# Patient Record
Sex: MALE | URBAN - METROPOLITAN AREA
[De-identification: names, ages, dates, MRNs, and addresses within clinical notes are randomized per-mention and may not be internally consistent; named-entity substitution may affect disease eponyms.]

---

## 2021-11-09 ENCOUNTER — HOSPITAL ENCOUNTER (EMERGENCY)
Facility: CLINIC | Age: 61
Discharge: HOME | End: 2021-11-09
Attending: EMERGENCY MEDICINE

## 2021-11-09 DIAGNOSIS — Z53.9 ERRONEOUS ENCOUNTER--DISREGARD: ICD-10-CM

## 2021-11-09 NOTE — ED NOTES
Bed: HW07UR  Expected date:   Expected time:   Means of arrival:   Comments:  [facility] [place] [facility]  61  yr old   Altered mental status

## 2021-11-09 NOTE — ED PROVIDER NOTES
Duplicate encounter.  Please see encounter from today's date with MRN [id number].         Enrico Nichole MD  11/09/21 [id number]